# Patient Record
(demographics unavailable — no encounter records)

---

## 2024-10-22 NOTE — HISTORY OF PRESENT ILLNESS
[8] : 8 [5] : 5 [Dull/Aching] : dull/aching [Constant] : constant [Walking] : walking [de-identified] : INJURY  10/22/24:  Returns today about 5-6 weeks after fracture right 2nd toe fracture. Has very little c/o pain. Wearing her flat sneakers.  10/1/24  Return visit for this 78 year old female who dropped a jar on her rt foot x 1 1/2 weeks ago, injuring her rt 2nd toe. C/o pain and swelling. saw a podiatrist who took an xrays which revealed a fx. Advised to antonio tape the toes. [] : no [FreeTextEntry9] : advil [de-identified] : x-rays

## 2024-10-22 NOTE — PLAN
[TextEntry] : The patient was advised of the diagnosis. The natural history of the pathology was explained in full to the patient in layman's terms. All questions were answered. The risks and benefits of surgical and non-surgical treatment alternatives were explained in full to the patient.  May continue with normal shoe wear.  Angelo taping of her toes was discussed.

## 2024-10-22 NOTE — HISTORY OF PRESENT ILLNESS
[8] : 8 [5] : 5 [Dull/Aching] : dull/aching [Constant] : constant [Walking] : walking [de-identified] : INJURY  10/22/24:  Returns today about 5-6 weeks after fracture right 2nd toe fracture. Has very little c/o pain. Wearing her flat sneakers.  10/1/24  Return visit for this 78 year old female who dropped a jar on her rt foot x 1 1/2 weeks ago, injuring her rt 2nd toe. C/o pain and swelling. saw a podiatrist who took an xrays which revealed a fx. Advised to antonio tape the toes. [] : no [FreeTextEntry9] : advil [de-identified] : x-rays

## 2024-10-22 NOTE — PHYSICAL EXAM
[Normal Mood and Affect] : normal mood and affect [Able to Communicate] : able to communicate [Well Developed] : well developed [Well Nourished] : well nourished [5___] : inversion 5[unfilled]/5 [Right] : right foot [Fracture] : Fracture [] : hammer toes [2nd] : 2nd [Moderate] : moderate swelling of toe(s) [FreeTextEntry8] : Swelling and erythema from middle phalanx to tip of toe.  No tenderness middle phalanx. [de-identified] : Transverse fracture middle phalanx some signs of bridging callus

## 2024-10-22 NOTE — PHYSICAL EXAM
[Normal Mood and Affect] : normal mood and affect [Able to Communicate] : able to communicate [Well Developed] : well developed [Well Nourished] : well nourished [5___] : inversion 5[unfilled]/5 [Right] : right foot [Fracture] : Fracture [] : hammer toes [2nd] : 2nd [Moderate] : moderate swelling of toe(s) [FreeTextEntry8] : Swelling and erythema from middle phalanx to tip of toe.  No tenderness middle phalanx. [de-identified] : Transverse fracture middle phalanx some signs of bridging callus

## 2025-02-07 NOTE — PLAN
[TextEntry] : The natural progression of osteoarthritis was explained to the patient.  We discussed the possible treatment options from conservative to operative.  These included NSAIDs, Glucosamine and Chondroitin sulfate, and physical therapy as well different types of injections.  We also discussed that at some point they may progress to need a TKA.  Information and pamphlets were given.  Cortisone injection given today, right knee. Medication was injected into the above treated area. After verbal consent using sterile preparation and technique. The risks, benefits, and alternatives to cortisone injection were explained in full to the patient. Risks outlined include but are not limited to infection, sepsis, bleeding, scarring, skin discoloration, temporary increase in pain, syncopal episode, failure to resolve symptoms, allergic reaction, symptom recurrence, and elevation of blood sugar in diabetics. Patient understood the risks. All questions were answered. After discussion of options, patient requested an injection. Oral informed consent was obtained and sterile prep was done of the injection site. Sterile technique was utilized for the procedure including the preparation of the solutions used for the injection. Patient tolerated the procedure well. Advised to ice the injection site this evening. Prep with Betadine locally to site. Sterile technique used. Patient tolerated procedure well. Post Procedure Instructions: Patient was advised to call if redness, pain, or fever occur and apply ice for 15 min. out of every hour for the next 12-24 hours as tolerated.   Suggested cane for support.  Patient is being referred for physical therapy for various modalities for her back.

## 2025-02-07 NOTE — HISTORY OF PRESENT ILLNESS
[de-identified] : 02/07/25:  Return visit for a 78 year old female who [place]. C/o intermittent lateral rt knee pain since. [place]imp[place]. Also c/o some recurrence of rt sided LBP.  8/5/24 Return visit for this 78 year old female woke up 2 days ago c/o acute onset of lt sided LBP x last 2 days duration. Had trouble standing erect. Applied a thermacare patch with little relief. Rested and feeling slightly better. LBP was a "8-9" and now is a "3-4". Took Advil 200mg prn. [FreeTextEntry1] : left knee

## 2025-02-07 NOTE — PHYSICAL EXAM
[Normal Mood and Affect] : normal mood and affect [Able to Communicate] : able to communicate [Well Developed] : well developed [Well Nourished] : well nourished [5___] : hamstring 5[unfilled]/5 [AP] : anteroposterior [Lateral] : lateral [[place]] : skyline [Degenerative change] : Degenerative change [NL (90)] : forward flexion 90 degrees [NL (30)] : right lateral bending 30 degrees [Extension] : extension [Bending to left] : bending to left [Disc space narrowing] : Disc space narrowing [Spondylolithesis] : Spondylolithesis [NL (0)] : extension 0 degrees [Right] : right knee [Advanced patellofemoral OA] : Advanced patellofemoral OA [FreeTextEntry1] : Grade 2 spondylo at L5-S1 with sever DDD at L5-S1 and pars defect L5.MIld DDD at L3-L4 and L4-L5 [de-identified] : extension 20 degrees [de-identified] : left lateral bending 20 degrees [] : negative Lachmann [FreeTextEntry9] : Moderate lateral joint narrowing.  Spurs medial and lateral.  PF spurs. [TWNoteComboBox7] : flexion 120 degrees

## 2025-02-27 NOTE — HISTORY OF PRESENT ILLNESS
[de-identified] : 02/27/25:  Patient returns today feeling better after her last cortisone injection x 2 weeks ago, but still has a little discomfort leaving for [place] and would like to repeat the injection.  02/07/25:  Return visit for a 78 year old female who [place] the [place] 2 [place]. C/o intermittent lateral rt knee pain since. [place]. Also c/o some recurrence of rt sided LBP.  8/5/24 Return visit for this 78 year old female woke up 2 days ago c/o acute onset of lt sided LBP x last 2 days duration. Had trouble standing erect. Applied a thermacare patch with little relief. Rested and feeling slightly better. LBP was a "8-9" and now is a "3-4". Took Advil 200mg prn. [FreeTextEntry1] : left knee

## 2025-02-27 NOTE — PHYSICAL EXAM
[Normal Mood and Affect] : normal mood and affect [Able to Communicate] : able to communicate [Well Developed] : well developed [Well Nourished] : well nourished [NL (0)] : extension 0 degrees [5___] : hamstring 5[unfilled]/5 [Right] : right knee [AP] : anteroposterior [Lateral] : lateral [[place]] : skyline [Degenerative change] : Degenerative change [Advanced patellofemoral OA] : Advanced patellofemoral OA [] : negative Lachmann [FreeTextEntry9] : Moderate lateral joint narrowing.  Spurs medial and lateral.  PF spurs. [TWNoteComboBox7] : flexion 120 degrees

## 2025-02-27 NOTE — PLAN
[TextEntry] : The natural progression of osteoarthritis was explained to the patient.  We discussed the possible treatment options from conservative to operative.  These included NSAIDs, Glucosamine and Chondroitin sulfate, and physical therapy as well different types of injections.  We also discussed that at some point they may progress to need a TKA.  Information and pamphlets were given.  Cortisone injection repeated today, right knee. Medication was injected into the above treated area. After verbal consent using sterile preparation and technique. The risks, benefits, and alternatives to cortisone injection were explained in full to the patient. Risks outlined include but are not limited to infection, sepsis, bleeding, scarring, skin discoloration, temporary increase in pain, syncopal episode, failure to resolve symptoms, allergic reaction, symptom recurrence, and elevation of blood sugar in diabetics. Patient understood the risks. All questions were answered. After discussion of options, patient requested an injection. Oral informed consent was obtained and sterile prep was done of the injection site. Sterile technique was utilized for the procedure including the preparation of the solutions used for the injection. Patient tolerated the procedure well. Advised to ice the injection site this evening. Prep with Betadine locally to site. Sterile technique used. Patient tolerated procedure well. Post Procedure Instructions: Patient was advised to call if redness, pain, or fever occur and apply ice for 15 min. out of every hour for the next 12-24 hours as tolerated.

## 2025-04-16 NOTE — DISCUSSION/SUMMARY
[FreeTextEntry1] : (Aunt of CAROLYNE Victoria) This is a 78-year-old female with past medical history significant for hypertension, mild carotid artery disease, hyperlipidemia, breast cancer, status post double mastectomy for breast cancer, status post hysterectomy for cervical cancer, who comes in for cardiac follow-up evaluation. She denies chest pain, shortness of breath, dizziness or syncope.   she has no history of rheumatic fever.  She does not drink excessive caffeine or alcohol. Cardiac risk factors include hypertension hyperlipidemia. Electrocardiogram done April 16, 2025 demonstrated normal sinus rhythm rate of 86 bpm is otherwise remarkable for left atrial abnormality. She is tolerating Lipitor 20 mg/day and remains on her triamterene. The patient is complaining of bilateral varicosities left greater than right with subsequent left ankle swelling.  She is interested in seeing a vascular surgeon regarding possible therapeutic intervention. She is also complaining of tension in her cervical neck with clicking and sometimes severe posterior headaches. I recommended she make an appoint with a neurologist to evaluate her complaints.  She will likely need a cervical MRI. Show new blood work done today for lipid panel, SMA 20, electrolytes, lipoprotein B, and direct LDL cholesterol. Echo Doppler examination done June 11, 2024 demonstrated normal left ventricular ejection fraction of 56%, mild thickening of the aortic valve leaflets, mildly calcified mitral annulus, minimal mitral valve regurgitation, minimal pulmonic valve regurgitation and minimal to mild tricuspid valve regurgitation. Carotid Doppler examination done June 11, 2024 demonstrated mild bilateral plaque in the bulbs.  Cardiac catheterization done August 26, 2024 which revealed no coronary artery disease. Carotid Doppler examination done June 11, 2024 demonstrated mild internal carotid artery stenosis of 1 to 19%. The patient's LDL target remains less than 70 mg/dL.   Exercise stress test done August 12, 2024 demonstrated ST-T wave changes consistent with myocardial ischemia.  The patient had exertional chest pressure as well.  The patient will follow-up with me after the cardiac catheterization is completed. Lipid panel done June 4, 2024 demonstrated cholesterol 188, LDL 96 mg/dL, lipoprotein B90 3 mg/dL, triglycerides 199 mg/dL, HDL 52 mg/dL hemoglobin A1c of 5.8. Further recommendations regarding lipid panel lipid-lowering therapy will be made after her cardiac catheterization has been completed.  Electrocardiogram done May 9, 2024 demonstrated normal sinus rhythm rate 75 bpm is otherwise unremarkable. Patient is concerned about developing atherosclerosis. She will schedule an exercise stress test to rule out significant coronary artery disease. She will schedule an echo Doppler examination to evaluate her murmur, left ventricular function, chamber size, and rule out hypertrophy. She may have a carotid bruit versus transmitted murmur.  I have asked to schedule carotid Doppler examination as well. A detailed discussion of lifestyle modification was done today. The patient has a good understanding of the diagnosis, and treatment plan. Lifestyle modification was also outlined..  The patient understands that aerobic exercises must be increased to 40 minutes 4 times per week. A detailed discussion of lifestyle modification was done today. The patient has a good understanding of the diagnosis, and treatment plan. Lifestyle modification was also outlined.

## 2025-04-16 NOTE — REASON FOR VISIT
[CV Risk Factors and Non-Cardiac Disease] : CV risk factors and non-cardiac disease [Hyperlipidemia] : hyperlipidemia [Hypertension] : hypertension [Carotid, Aortic and Peripheral Vascular Disease] : carotid, aortic and peripheral vascular disease [FreeTextEntry3] : Dr. Shirin Turcios [FreeTextEntry1] : This is a 78-year-old female with past medical history significant for hypertension, hyperlipidemia, mild carotid artery disease, status post mastectomy, status post hysterectomy, who comes in for cardiac evaluation.  She is feeling well today however reports occasional dyspnea on exertion. Denies palpitations, dizziness, syncope, or recent falls.   Cardiac risk factors: hypertension, hyperlipidemia   Patient is generally well today. She denies chest pain, shortness of breath, palpitations, dizziness, syncopal episodes. She recently had a CCTA conducted on 8/26/24 which was unremarkable.   LABS/IMAGING: Stress test performed today 8/12/2024 - patient with abnormal response, she experienced chest pain and tightness with exertion, there were subtle down slopping ST depressions, after the test concluded and patient sat down her chest pain and tightness went away; starting blood pressure 127/82 in LUE and ending in stage 3 180/86 in LUE.  Blood work reviewed from 6/10/2024 - cholesterol 188, triglycerides 199, HDL 52, LDL calculated 96, A1C 5.8, k 3.7 Echocardiogram and US duplex bilateral carotid arteries ultrasound reviewed from 6/11/2024.